# Patient Record
Sex: MALE | URBAN - METROPOLITAN AREA
[De-identification: names, ages, dates, MRNs, and addresses within clinical notes are randomized per-mention and may not be internally consistent; named-entity substitution may affect disease eponyms.]

---

## 2023-04-05 ENCOUNTER — APPOINTMENT (RX ONLY)
Dept: URBAN - METROPOLITAN AREA CLINIC 23 | Facility: CLINIC | Age: 53
Setting detail: DERMATOLOGY
End: 2023-04-05

## 2023-04-05 DIAGNOSIS — L21.8 OTHER SEBORRHEIC DERMATITIS: ICD-10-CM | Status: INADEQUATELY CONTROLLED

## 2023-04-05 DIAGNOSIS — L28.0 LICHEN SIMPLEX CHRONICUS: ICD-10-CM | Status: INADEQUATELY CONTROLLED

## 2023-04-05 PROCEDURE — ? COUNSELING

## 2023-04-05 PROCEDURE — ? PRESCRIPTION

## 2023-04-05 PROCEDURE — ? PRESCRIPTION MEDICATION MANAGEMENT

## 2023-04-05 PROCEDURE — 99204 OFFICE O/P NEW MOD 45 MIN: CPT

## 2023-04-05 PROCEDURE — ? PHOTO-DOCUMENTATION

## 2023-04-05 RX ORDER — KETOCONAZOLE 20 MG/ML
SHAMPOO, SUSPENSION TOPICAL TIW
Qty: 120 | Refills: 3 | Status: ERX | COMMUNITY
Start: 2023-04-05

## 2023-04-05 RX ORDER — FLUOCINONIDE 0.5 MG/ML
SOLUTION TOPICAL BID
Qty: 60 | Refills: 3 | Status: ERX | COMMUNITY
Start: 2023-04-05

## 2023-04-05 RX ADMIN — FLUOCINONIDE: 0.5 SOLUTION TOPICAL at 00:00

## 2023-04-05 RX ADMIN — KETOCONAZOLE: 20 SHAMPOO, SUSPENSION TOPICAL at 00:00

## 2023-04-05 ASSESSMENT — SEVERITY ASSESSMENT
SEVERITY: MODERATE
HOW SEVERE IS THIS PATIENT'S CONDITION?: MILD

## 2023-04-05 ASSESSMENT — LOCATION ZONE DERM: LOCATION ZONE: SCALP

## 2023-04-05 ASSESSMENT — BSA RASH: BSA RASH: 10

## 2023-04-05 ASSESSMENT — LOCATION DETAILED DESCRIPTION DERM: LOCATION DETAILED: MID-OCCIPITAL SCALP

## 2023-04-05 ASSESSMENT — LOCATION SIMPLE DESCRIPTION DERM: LOCATION SIMPLE: POSTERIOR SCALP

## 2023-04-05 NOTE — PROCEDURE: PRESCRIPTION MEDICATION MANAGEMENT
Render In Strict Bullet Format?: No
Detail Level: Zone
Initiate Treatment: fluocinonide 0.05 % topical solution BID\\nQuantity: 60.0 ml  Days Supply: 30\\nSig: Apply to affected areas on scalp twice daily for 2 weeks on, 1 week off. Repeat PRN flaring/severe itching.
Samples Given: Vanicream shampoo/conditioner
Initiate Treatment: ketoconazole 2 % shampoo TIW\\nQuantity: 120.0 ml  Days Supply: 30\\nSig: Lathe onto scalp while showering, three times per week, leave on for 5 minutes then rinse. Can be drying, may follow with conditioner.\\n Samples given to patient: Vanicream shampoo/conditioner.

## 2023-04-26 ENCOUNTER — APPOINTMENT (RX ONLY)
Dept: URBAN - METROPOLITAN AREA CLINIC 23 | Facility: CLINIC | Age: 53
Setting detail: DERMATOLOGY
End: 2023-04-26

## 2023-04-26 DIAGNOSIS — L28.0 LICHEN SIMPLEX CHRONICUS: ICD-10-CM

## 2023-04-26 DIAGNOSIS — L21.8 OTHER SEBORRHEIC DERMATITIS: ICD-10-CM | Status: WELL CONTROLLED

## 2023-04-26 PROCEDURE — ? PRESCRIPTION MEDICATION MANAGEMENT

## 2023-04-26 PROCEDURE — ? COUNSELING

## 2023-04-26 PROCEDURE — ? PHOTO-DOCUMENTATION

## 2023-04-26 PROCEDURE — 99214 OFFICE O/P EST MOD 30 MIN: CPT

## 2023-04-26 ASSESSMENT — SEVERITY ASSESSMENT
HOW SEVERE IS THIS PATIENT'S CONDITION?: MILD
SEVERITY: MILD TO MODERATE

## 2023-04-26 ASSESSMENT — LOCATION DETAILED DESCRIPTION DERM: LOCATION DETAILED: MID-OCCIPITAL SCALP

## 2023-04-26 ASSESSMENT — LOCATION SIMPLE DESCRIPTION DERM: LOCATION SIMPLE: POSTERIOR SCALP

## 2023-04-26 ASSESSMENT — LOCATION ZONE DERM: LOCATION ZONE: SCALP

## 2023-04-26 NOTE — PROCEDURE: PRESCRIPTION MEDICATION MANAGEMENT
Render In Strict Bullet Format?: No
Detail Level: Zone
Continue Regimen: fluocinonide 0.05 % topical solution BID\\nQuantity: 60.0 ml  Days Supply: 30\\nSig: Apply to affected areas on scalp twice daily for 2 weeks on, 1 week off. Repeat PRN flaring/severe itching.
Continue Regimen: ketoconazole 2 % shampoo TIW\\nQuantity: 120.0 ml  Days Supply: 30\\nSig: Lathe onto scalp while showering, three times per week, leave on for 5 minutes then rinse. Can be drying, may follow with conditioner.

## 2023-06-21 ENCOUNTER — APPOINTMENT (RX ONLY)
Dept: URBAN - METROPOLITAN AREA CLINIC 23 | Facility: CLINIC | Age: 53
Setting detail: DERMATOLOGY
End: 2023-06-21

## 2023-06-21 DIAGNOSIS — L28.0 LICHEN SIMPLEX CHRONICUS: ICD-10-CM | Status: IMPROVED

## 2023-06-21 DIAGNOSIS — L21.8 OTHER SEBORRHEIC DERMATITIS: ICD-10-CM | Status: WELL CONTROLLED

## 2023-06-21 PROCEDURE — ? PHOTO-DOCUMENTATION

## 2023-06-21 PROCEDURE — ? COUNSELING

## 2023-06-21 PROCEDURE — ? PRESCRIPTION MEDICATION MANAGEMENT

## 2023-06-21 PROCEDURE — 99214 OFFICE O/P EST MOD 30 MIN: CPT

## 2023-06-21 ASSESSMENT — LOCATION SIMPLE DESCRIPTION DERM: LOCATION SIMPLE: POSTERIOR SCALP

## 2023-06-21 ASSESSMENT — SEVERITY ASSESSMENT
HOW SEVERE IS THIS PATIENT'S CONDITION?: CLEAR
SEVERITY: ALMOST CLEAR

## 2023-06-21 ASSESSMENT — LOCATION DETAILED DESCRIPTION DERM: LOCATION DETAILED: MID-OCCIPITAL SCALP

## 2023-06-21 ASSESSMENT — LOCATION ZONE DERM: LOCATION ZONE: SCALP

## 2023-06-21 NOTE — PROCEDURE: PRESCRIPTION MEDICATION MANAGEMENT
Render In Strict Bullet Format?: No
Detail Level: Zone
Plan: Recommend OTC Itch X cream PRN to scalp, apply during “off week” of topical steroid.
Continue Regimen: fluocinonide 0.05 % topical solution BID\\nQuantity: 60.0 ml  Days Supply: 30\\nSig: Apply to affected areas on scalp twice daily for 2 weeks on, 1 week off. Repeat PRN flaring/severe itching.
Continue Regimen: ketoconazole 2 % shampoo TIW\\nQuantity: 120.0 ml  Days Supply: 30\\nSig: Lathe onto scalp while showering, three times per week, leave on for 5 minutes then rinse. Can be drying, may follow with conditioner.

## 2023-08-16 ENCOUNTER — APPOINTMENT (RX ONLY)
Dept: URBAN - METROPOLITAN AREA CLINIC 23 | Facility: CLINIC | Age: 53
Setting detail: DERMATOLOGY
End: 2023-08-16

## 2023-08-16 DIAGNOSIS — L28.0 LICHEN SIMPLEX CHRONICUS: ICD-10-CM

## 2023-08-16 DIAGNOSIS — L21.8 OTHER SEBORRHEIC DERMATITIS: ICD-10-CM

## 2023-08-16 PROCEDURE — ? COUNSELING

## 2023-08-16 PROCEDURE — ? PHOTO-DOCUMENTATION

## 2023-08-16 PROCEDURE — ? PRESCRIPTION SAMPLES PROVIDED

## 2023-08-16 PROCEDURE — ? PRESCRIPTION MEDICATION MANAGEMENT

## 2023-08-16 PROCEDURE — ? PRESCRIPTION

## 2023-08-16 PROCEDURE — 99214 OFFICE O/P EST MOD 30 MIN: CPT

## 2023-08-16 RX ORDER — CLOBETASOL PROPIONATE 0.5 MG/ML
1 SOLUTION TOPICAL BID
Qty: 50 | Refills: 3 | Status: ERX | COMMUNITY
Start: 2023-08-16

## 2023-08-16 RX ADMIN — CLOBETASOL PROPIONATE 1: 0.5 SOLUTION TOPICAL at 00:00

## 2023-08-16 ASSESSMENT — LOCATION ZONE DERM: LOCATION ZONE: SCALP

## 2023-08-16 ASSESSMENT — LOCATION SIMPLE DESCRIPTION DERM
LOCATION SIMPLE: LEFT OCCIPITAL SCALP
LOCATION SIMPLE: POSTERIOR SCALP

## 2023-08-16 ASSESSMENT — LOCATION DETAILED DESCRIPTION DERM
LOCATION DETAILED: MID-OCCIPITAL SCALP
LOCATION DETAILED: LEFT SUPERIOR OCCIPITAL SCALP

## 2023-08-16 NOTE — PROCEDURE: PRESCRIPTION MEDICATION MANAGEMENT
Render In Strict Bullet Format?: No
Detail Level: Zone
Initiate Treatment: clobetasol 0.05 % scalp solution Bid\\nSig: Apply to scalp twice daily for 2 weeks then stop for 1 week, repeat as needed for flares
Discontinue Regimen: fluocinonide 0.05 % topical solution BID\\nSig: Apply to affected areas on scalp twice daily for 2 weeks on, 1 week off. Repeat PRN flaring/severe itching.
Continue Regimen: ketoconazole 2 % shampoo TIW\\nSig: Lathe onto scalp while showering, three times per week, leave on for 5 minutes then rinse. Can be drying, may follow with conditioner.

## 2023-09-13 ENCOUNTER — APPOINTMENT (RX ONLY)
Dept: URBAN - METROPOLITAN AREA CLINIC 23 | Facility: CLINIC | Age: 53
Setting detail: DERMATOLOGY
End: 2023-09-13

## 2023-09-13 DIAGNOSIS — L21.8 OTHER SEBORRHEIC DERMATITIS: ICD-10-CM

## 2023-09-13 DIAGNOSIS — L28.0 LICHEN SIMPLEX CHRONICUS: ICD-10-CM

## 2023-09-13 PROCEDURE — ? COUNSELING

## 2023-09-13 PROCEDURE — ? PRESCRIPTION MEDICATION MANAGEMENT

## 2023-09-13 PROCEDURE — ? PRESCRIPTION

## 2023-09-13 PROCEDURE — ? PHOTO-DOCUMENTATION

## 2023-09-13 PROCEDURE — 99214 OFFICE O/P EST MOD 30 MIN: CPT

## 2023-09-13 RX ORDER — CLINDAMYCIN PHOSPHATE 10 MG/ML
SOLUTION TOPICAL
Qty: 60 | Refills: 0 | Status: ERX | COMMUNITY
Start: 2023-09-13

## 2023-09-13 RX ORDER — CLOBETASOL PROPIONATE 0.5 MG/ML
1 SOLUTION TOPICAL BID
Qty: 50 | Refills: 3 | Status: ERX

## 2023-09-13 RX ADMIN — CLINDAMYCIN PHOSPHATE: 10 SOLUTION TOPICAL at 00:00

## 2023-09-13 ASSESSMENT — LOCATION DETAILED DESCRIPTION DERM: LOCATION DETAILED: MID-OCCIPITAL SCALP

## 2023-09-13 ASSESSMENT — LOCATION SIMPLE DESCRIPTION DERM: LOCATION SIMPLE: POSTERIOR SCALP

## 2023-09-13 ASSESSMENT — LOCATION ZONE DERM: LOCATION ZONE: SCALP

## 2023-09-13 NOTE — PROCEDURE: PRESCRIPTION MEDICATION MANAGEMENT
Render In Strict Bullet Format?: No
Detail Level: Zone
Initiate Treatment: clindamycin phosphate 1 % topical solution \\nQuantity: 60.0 ml\\nSig: Apply a thin layer to scalp once a day
Discontinue Regimen: fluocinonide 0.05 % topical solution BID\\nSig: Apply to affected areas on scalp twice daily for 2 weeks on, 1 week off. Repeat PRN flaring/severe itching.
Continue Regimen: clobetasol 0.05 % scalp solution Bid\\nSig: Apply to scalp twice daily for 2 weeks then stop for 1 week, repeat as needed for flares
Continue Regimen: ketoconazole 2 % shampoo TIW\\nSig: Lathe onto scalp while showering, three times per week, leave on for 5 minutes then rinse. Can be drying, may follow with conditioner.

## 2023-11-22 ENCOUNTER — APPOINTMENT (RX ONLY)
Dept: URBAN - METROPOLITAN AREA CLINIC 23 | Facility: CLINIC | Age: 53
Setting detail: DERMATOLOGY
End: 2023-11-22

## 2023-11-22 DIAGNOSIS — L21.8 OTHER SEBORRHEIC DERMATITIS: ICD-10-CM | Status: INADEQUATELY CONTROLLED

## 2023-11-22 DIAGNOSIS — Z71.89 OTHER SPECIFIED COUNSELING: ICD-10-CM

## 2023-11-22 PROBLEM — L30.9 DERMATITIS, UNSPECIFIED: Status: ACTIVE | Noted: 2023-11-22

## 2023-11-22 PROCEDURE — ? BIOPSY BY PUNCH METHOD

## 2023-11-22 PROCEDURE — ? PRESCRIPTION

## 2023-11-22 PROCEDURE — 99213 OFFICE O/P EST LOW 20 MIN: CPT | Mod: 25

## 2023-11-22 PROCEDURE — ? COUNSELING

## 2023-11-22 PROCEDURE — ? PRESCRIPTION MEDICATION MANAGEMENT

## 2023-11-22 PROCEDURE — 11104 PUNCH BX SKIN SINGLE LESION: CPT

## 2023-11-22 PROCEDURE — ? SUNSCREEN RECOMMENDATIONS

## 2023-11-22 RX ORDER — CLOBETASOL PROPIONATE 0.5 MG/ML
1 SOLUTION TOPICAL BID
Qty: 50 | Refills: 6 | Status: ERX

## 2023-11-22 RX ORDER — KETOCONAZOLE 20 MG/ML
1 SHAMPOO, SUSPENSION TOPICAL TIW
Qty: 120 | Refills: 5 | Status: ERX

## 2023-11-22 RX ORDER — KETOCONAZOLE 20 MG/G
1 CREAM TOPICAL BID
Qty: 30 | Refills: 3 | Status: ERX | COMMUNITY
Start: 2023-11-22

## 2023-11-22 RX ADMIN — KETOCONAZOLE 1: 20 CREAM TOPICAL at 00:00

## 2023-11-22 ASSESSMENT — LOCATION DETAILED DESCRIPTION DERM
LOCATION DETAILED: MID-FRONTAL SCALP
LOCATION DETAILED: INFERIOR MID FOREHEAD
LOCATION DETAILED: POSTERIOR MID-PARIETAL SCALP

## 2023-11-22 ASSESSMENT — LOCATION ZONE DERM
LOCATION ZONE: SCALP
LOCATION ZONE: FACE

## 2023-11-22 ASSESSMENT — LOCATION SIMPLE DESCRIPTION DERM
LOCATION SIMPLE: ANTERIOR SCALP
LOCATION SIMPLE: POSTERIOR SCALP
LOCATION SIMPLE: INFERIOR FOREHEAD

## 2023-11-22 ASSESSMENT — SEVERITY ASSESSMENT: HOW SEVERE IS THIS PATIENT'S CONDITION?: MODERATE

## 2023-11-22 NOTE — PROCEDURE: PRESCRIPTION MEDICATION MANAGEMENT
Continue Regimen: ketoconazole 2 % shampoo TIW\\nQuantity: 120.0 ml  Days Supply: 30\\nSig: Apply to scalp three times weekly, let sit for 5 minutes, rinse throughly. Can be drying, may follow with conditioner.\\n\\nketoconazole 2 % topical cream BID\\nQuantity: 30.0 g  Days Supply: 30\\nSig: Apply twice daily to flaky areas on face/ears for 1 month or until resolved. Repeat for flares.\\n\\nclobetasol 0.05 % scalp solution Bid\\nQuantity: 50.0 ml  Days Supply: 30\\nSig: Apply to scalp twice daily for 2 weeks then stop for 1 week, repeat as needed for flares.
Detail Level: Zone
Render In Strict Bullet Format?: No

## 2023-11-22 NOTE — PROCEDURE: BIOPSY BY PUNCH METHOD
Detail Level: Detailed
Was A Bandage Applied: Yes
Punch Size In Mm: 4
Size Of Lesion In Cm (Optional): 0
Depth Of Punch Biopsy: dermis
Biopsy Type: H and E
Anesthesia Type: 1% lidocaine with epinephrine
Anesthesia Volume In Cc: 0.5
Hemostasis: None
Epidermal Sutures: 3-0 Monosof
Number Of Epidermal Sutures (Optional): 2
Wound Care: Petrolatum
Dressing: bandage
Suture Removal: 12 days
Patient Will Remove Sutures At Home?: No
Lab: [id number]
Lab Facility: [id number]
Consent: Written consent was obtained and risks were reviewed including but not limited to scarring, infection, bleeding, scabbing, incomplete removal, nerve damage and allergy to anesthesia.
Post-Care Instructions: I reviewed with the patient in detail post-care instructions. Patient is to keep the biopsy site dry overnight, and then apply bacitracin twice daily until healed. Patient may apply hydrogen peroxide soaks to remove any crusting.
Home Suture Removal Text: Patient was provided a home suture removal kit and will remove their sutures at home.  If they have any questions or difficulties they will call the office.
Notification Instructions: Patient will be notified of biopsy results. However, patient instructed to call the office if not contacted within 2 weeks.
Billing Type: Third-Party Bill
Information: Selecting Yes will display possible errors in your note based on the variables you have selected. This validation is only offered as a suggestion for you. PLEASE NOTE THAT THE VALIDATION TEXT WILL BE REMOVED WHEN YOU FINALIZE YOUR NOTE. IF YOU WANT TO FAX A PRELIMINARY NOTE YOU WILL NEED TO TOGGLE THIS TO 'NO' IF YOU DO NOT WANT IT IN YOUR FAXED NOTE.

## 2023-12-06 ENCOUNTER — APPOINTMENT (RX ONLY)
Dept: URBAN - METROPOLITAN AREA CLINIC 23 | Facility: CLINIC | Age: 53
Setting detail: DERMATOLOGY
End: 2023-12-06

## 2023-12-06 DIAGNOSIS — Z48.02 ENCOUNTER FOR REMOVAL OF SUTURES: ICD-10-CM

## 2023-12-06 PROCEDURE — 99211 OFF/OP EST MAY X REQ PHY/QHP: CPT

## 2023-12-06 PROCEDURE — ? SUTURE REMOVAL (NO GLOBAL PERIOD)

## 2023-12-06 ASSESSMENT — LOCATION SIMPLE DESCRIPTION DERM: LOCATION SIMPLE: POSTERIOR SCALP

## 2023-12-06 ASSESSMENT — LOCATION DETAILED DESCRIPTION DERM: LOCATION DETAILED: RIGHT SUPERIOR OCCIPITAL SCALP

## 2023-12-06 ASSESSMENT — LOCATION ZONE DERM: LOCATION ZONE: SCALP

## 2024-01-03 ENCOUNTER — APPOINTMENT (RX ONLY)
Dept: URBAN - METROPOLITAN AREA CLINIC 23 | Facility: CLINIC | Age: 54
Setting detail: DERMATOLOGY
End: 2024-01-03

## 2024-01-03 DIAGNOSIS — L66.11 CLASSIC LICHEN PLANOPILARIS: ICD-10-CM | Status: INADEQUATELY CONTROLLED

## 2024-01-03 DIAGNOSIS — L66.1 LICHEN PLANOPILARIS: ICD-10-CM | Status: INADEQUATELY CONTROLLED

## 2024-01-03 PROCEDURE — ? COUNSELING

## 2024-01-03 PROCEDURE — ? PRESCRIPTION MEDICATION MANAGEMENT

## 2024-01-03 PROCEDURE — 99214 OFFICE O/P EST MOD 30 MIN: CPT

## 2024-01-03 PROCEDURE — ? DIAGNOSIS COMMENT

## 2024-01-03 PROCEDURE — ? PRESCRIPTION

## 2024-01-03 RX ORDER — DOXYCYCLINE HYCLATE 100 MG/1
1 CAPSULE, GELATIN COATED ORAL BID
Qty: 60 | Refills: 0 | Status: ERX | COMMUNITY
Start: 2024-01-03

## 2024-01-03 RX ORDER — MINOXIDIL 5 G/100ML
1 SOLUTION TOPICAL QD
Qty: 60 | Refills: 0 | Status: ERX | COMMUNITY
Start: 2024-01-03

## 2024-01-03 RX ORDER — RUXOLITINIB 15 MG/G
1 CREAM TOPICAL QD
Qty: 1 | Refills: 3 | Status: ERX | COMMUNITY
Start: 2024-01-03

## 2024-01-03 RX ADMIN — MINOXIDIL 1: 5 SOLUTION TOPICAL at 00:00

## 2024-01-03 RX ADMIN — DOXYCYCLINE HYCLATE 1: 100 CAPSULE, GELATIN COATED ORAL at 00:00

## 2024-01-03 RX ADMIN — RUXOLITINIB 1: 15 CREAM TOPICAL at 00:00

## 2024-01-03 ASSESSMENT — LOCATION SIMPLE DESCRIPTION DERM: LOCATION SIMPLE: POSTERIOR SCALP

## 2024-01-03 ASSESSMENT — LOCATION DETAILED DESCRIPTION DERM: LOCATION DETAILED: RIGHT SUPERIOR OCCIPITAL SCALP

## 2024-01-03 ASSESSMENT — LOCATION ZONE DERM: LOCATION ZONE: SCALP

## 2024-01-03 NOTE — PROCEDURE: PRESCRIPTION MEDICATION MANAGEMENT
Detail Level: Zone
Initiate Treatment: Opzelura 1.5 % topical cream Qd\\nQuantity: 1.0 g  Days Supply: 30\\nSig: Apply to affected areas on the scalp once daily\\nSent to [facility] and handout given to patient \\n\\nminoxidil 5 % topical solution Qd\\nQuantity: 60.0 ml  Days Supply: 30\\nSig: Apply to affected areas on scalp once daily\\n\\nSent to [facility] and handout given to patient \\n\\ndoxycycline hyclate 100 mg capsule Bid\\nQuantity: 60.0 Capsule\\nSig: Take one capsule by mouth twice daily for 1 month with a full meal and water, can cause sun sensitivity
Render In Strict Bullet Format?: No